# Patient Record
Sex: MALE | Race: WHITE | NOT HISPANIC OR LATINO | URBAN - METROPOLITAN AREA
[De-identification: names, ages, dates, MRNs, and addresses within clinical notes are randomized per-mention and may not be internally consistent; named-entity substitution may affect disease eponyms.]

---

## 2019-11-18 ENCOUNTER — OFFICE VISIT (OUTPATIENT)
Dept: FAMILY MEDICINE CLINIC | Facility: CLINIC | Age: 54
End: 2019-11-18

## 2019-11-18 VITALS
BODY MASS INDEX: 24.59 KG/M2 | SYSTOLIC BLOOD PRESSURE: 122 MMHG | HEIGHT: 74 IN | TEMPERATURE: 97.5 F | HEART RATE: 69 BPM | WEIGHT: 191.6 LBS | OXYGEN SATURATION: 98 % | DIASTOLIC BLOOD PRESSURE: 62 MMHG

## 2019-11-18 DIAGNOSIS — R14.0 ABDOMINAL BLOATING: Primary | ICD-10-CM

## 2019-11-18 DIAGNOSIS — R79.89 LOW VITAMIN D LEVEL: ICD-10-CM

## 2019-11-18 DIAGNOSIS — R10.84 GENERALIZED ABDOMINAL PAIN: ICD-10-CM

## 2019-11-18 LAB
BILIRUB BLD-MCNC: NEGATIVE MG/DL
CLARITY, POC: CLEAR
COLOR UR: YELLOW
GLUCOSE UR STRIP-MCNC: NEGATIVE MG/DL
KETONES UR QL: NEGATIVE
LEUKOCYTE EST, POC: NEGATIVE
NITRITE UR-MCNC: NEGATIVE MG/ML
PH UR: 7.5 [PH] (ref 5–8)
PROT UR STRIP-MCNC: NEGATIVE MG/DL
RBC # UR STRIP: NEGATIVE /UL
SP GR UR: 1.01 (ref 1–1.03)
UROBILINOGEN UR QL: NORMAL

## 2019-11-18 PROCEDURE — 99214 OFFICE O/P EST MOD 30 MIN: CPT | Performed by: FAMILY MEDICINE

## 2019-11-18 PROCEDURE — 81003 URINALYSIS AUTO W/O SCOPE: CPT | Performed by: FAMILY MEDICINE

## 2019-11-18 RX ORDER — FAMOTIDINE 40 MG/1
40 TABLET, FILM COATED ORAL 2 TIMES DAILY
Qty: 60 TABLET | Refills: 0 | Status: SHIPPED | OUTPATIENT
Start: 2019-11-18 | End: 2020-08-19

## 2019-11-18 RX ORDER — AMOXICILLIN 250 MG
1000 CAPSULE ORAL
COMMUNITY

## 2019-11-18 NOTE — PROGRESS NOTES
Subjective   Salinas Pennington is a 54 y.o. male.     Chief Complaint   Patient presents with   • Bloated     achy back, after he has BM still feels like he needs to go        Woke up Friday AM nauseated.  Then moved his bowels but still bloated.  TUMS helps it go away.  Has been achy in his back and feels a little fuzzy in the head.   Feels good to drink cold water or to eat.   Did a cologuard 1-2 years ago.   Denies any changes in his bowels or black or tarry stools.           The following portions of the patient's history were reviewed and updated as appropriate: allergies, current medications, past family history, past medical history, past social history, past surgical history and problem list.    Past Medical History:   Diagnosis Date   • Dietary B12 deficiency    • Flu vaccine need    • Glenohumeral arthritis, right    • Hyperhidrosis, focal, primary     of left thigh, local toa cluster of veins-Abstracted from [facility]   • Need for hepatitis A vaccination    • Prostate cancer screening encounter, options and risks discussed     History of    • Right shoulder pain    • Screening for malignant neoplasm of colon    • Spider veins    • Varicose veins of both lower extremities with complications        History reviewed. No pertinent surgical history.    Family History   Problem Relation Age of Onset   • Other Mother         blood dyscrasia   • Osteoarthritis Mother    • Alzheimer's disease Father    • Pancreatic cancer Father    • No Known Problems Other        Social History     Socioeconomic History   • Marital status:      Spouse name: Not on file   • Number of children: Not on file   • Years of education: Not on file   • Highest education level: Not on file   Tobacco Use   • Smoking status: Never Smoker   Substance and Sexual Activity   • Alcohol use: Yes     Comment: 1 drink per month    • Drug use: No         Current Outpatient Medications:   •  Cholecalciferol (DIALYVITE VITAMIN D 5000 PO), Take  by  "mouth., Disp: , Rfl:   •  Cobalamin Combinations (B-12) 100-5000 MCG sublingual tablet, Place  under the tongue., Disp: , Rfl:   •  famotidine (PEPCID) 40 MG tablet, Take 1 tablet by mouth 2 (Two) Times a Day., Disp: 60 tablet, Rfl: 0    Review of Systems   Constitutional: Negative for chills, fatigue and fever.   HENT: Negative for congestion, rhinorrhea and sore throat.    Respiratory: Negative for cough and shortness of breath.    Cardiovascular: Negative for chest pain and leg swelling.   Gastrointestinal: Positive for abdominal pain and nausea. Negative for constipation, diarrhea and vomiting.   Endocrine: Negative for polydipsia and polyuria.   Genitourinary: Negative for dysuria.   Musculoskeletal: Negative for arthralgias and myalgias.   Skin: Negative for rash.   Neurological: Negative for dizziness.   Hematological: Does not bruise/bleed easily.   Psychiatric/Behavioral: Negative for sleep disturbance.       Objective   Vitals:    11/18/19 0954   BP: 122/62   Pulse: 69   Temp: 97.5 °F (36.4 °C)   SpO2: 98%   Weight: 86.9 kg (191 lb 9.6 oz)   Height: 188 cm (74\")     Body mass index is 24.6 kg/m².  Physical Exam   Constitutional: He is oriented to person, place, and time. He appears well-developed and well-nourished. No distress.   HENT:   Head: Normocephalic and atraumatic.   Mouth/Throat: Oropharynx is clear and moist.   Eyes: Conjunctivae are normal. Pupils are equal, round, and reactive to light.   Neck: Neck supple. No thyromegaly present.   Cardiovascular: Normal rate and regular rhythm.   No murmur heard.  Pulmonary/Chest: Effort normal and breath sounds normal.   Abdominal: Soft. Bowel sounds are normal. There is tenderness in the epigastric area. There is no rebound, no guarding and no CVA tenderness.   Musculoskeletal: He exhibits no edema.   Neurological: He is alert and oriented to person, place, and time.   Skin: Skin is warm and dry.   Psychiatric: He has a normal mood and affect. "         Assessment/Plan   Salinas was seen today for bloated.    Diagnoses and all orders for this visit:    Abdominal bloating  -     POC Urinalysis Dipstick, Automated  -     famotidine (PEPCID) 40 MG tablet; Take 1 tablet by mouth 2 (Two) Times a Day.  -     CBC & Differential  -     Comprehensive Metabolic Panel  -     H. Pylori Breath Test - Breath, Lung  -     Amylase  -     Lipase    Generalized abdominal pain  -     famotidine (PEPCID) 40 MG tablet; Take 1 tablet by mouth 2 (Two) Times a Day.  -     CBC & Differential  -     Comprehensive Metabolic Panel  -     Amylase  -     Lipase    Low vitamin D level  -     Vitamin D 25 hydroxy               Patient Instructions   Call me back with symptom update in a couple weeks.   Follow up pending lab results.

## 2019-11-19 LAB
25(OH)D3+25(OH)D2 SERPL-MCNC: 40.8 NG/ML (ref 30–100)
ALBUMIN SERPL-MCNC: 4.8 G/DL (ref 3.5–5.2)
ALBUMIN/GLOB SERPL: 2.5 G/DL
ALP SERPL-CCNC: 55 U/L (ref 39–117)
ALT SERPL-CCNC: 15 U/L (ref 1–41)
AMYLASE SERPL-CCNC: 45 U/L (ref 28–100)
AST SERPL-CCNC: 16 U/L (ref 1–40)
BASOPHILS # BLD AUTO: 0.03 10*3/MM3 (ref 0–0.2)
BASOPHILS NFR BLD AUTO: 0.8 % (ref 0–1.5)
BILIRUB SERPL-MCNC: 0.5 MG/DL (ref 0.2–1.2)
BUN SERPL-MCNC: 14 MG/DL (ref 6–20)
BUN/CREAT SERPL: 13.5 (ref 7–25)
CALCIUM SERPL-MCNC: 9.6 MG/DL (ref 8.6–10.5)
CHLORIDE SERPL-SCNC: 99 MMOL/L (ref 98–107)
CO2 SERPL-SCNC: 31.7 MMOL/L (ref 22–29)
CREAT SERPL-MCNC: 1.04 MG/DL (ref 0.76–1.27)
EOSINOPHIL # BLD AUTO: 0.08 10*3/MM3 (ref 0–0.4)
EOSINOPHIL NFR BLD AUTO: 2 % (ref 0.3–6.2)
ERYTHROCYTE [DISTWIDTH] IN BLOOD BY AUTOMATED COUNT: 13.6 % (ref 12.3–15.4)
GLOBULIN SER CALC-MCNC: 1.9 GM/DL
GLUCOSE SERPL-MCNC: 84 MG/DL (ref 65–99)
HCT VFR BLD AUTO: 45.2 % (ref 37.5–51)
HGB BLD-MCNC: 15.7 G/DL (ref 13–17.7)
IMM GRANULOCYTES # BLD AUTO: 0.01 10*3/MM3 (ref 0–0.05)
IMM GRANULOCYTES NFR BLD AUTO: 0.3 % (ref 0–0.5)
LIPASE SERPL-CCNC: 30 U/L (ref 13–60)
LYMPHOCYTES # BLD AUTO: 0.79 10*3/MM3 (ref 0.7–3.1)
LYMPHOCYTES NFR BLD AUTO: 19.9 % (ref 19.6–45.3)
MCH RBC QN AUTO: 32.1 PG (ref 26.6–33)
MCHC RBC AUTO-ENTMCNC: 34.7 G/DL (ref 31.5–35.7)
MCV RBC AUTO: 92.4 FL (ref 79–97)
MONOCYTES # BLD AUTO: 0.34 10*3/MM3 (ref 0.1–0.9)
MONOCYTES NFR BLD AUTO: 8.6 % (ref 5–12)
NEUTROPHILS # BLD AUTO: 2.71 10*3/MM3 (ref 1.7–7)
NEUTROPHILS NFR BLD AUTO: 68.4 % (ref 42.7–76)
NRBC BLD AUTO-RTO: 0 /100 WBC (ref 0–0.2)
PLATELET # BLD AUTO: 151 10*3/MM3 (ref 140–450)
POTASSIUM SERPL-SCNC: 4.4 MMOL/L (ref 3.5–5.2)
PROT SERPL-MCNC: 6.7 G/DL (ref 6–8.5)
RBC # BLD AUTO: 4.89 10*6/MM3 (ref 4.14–5.8)
SODIUM SERPL-SCNC: 141 MMOL/L (ref 136–145)
UREA BREATH TEST QL: NEGATIVE
WBC # BLD AUTO: 3.96 10*3/MM3 (ref 3.4–10.8)

## 2019-11-20 ENCOUNTER — TELEPHONE (OUTPATIENT)
Dept: FAMILY MEDICINE CLINIC | Facility: CLINIC | Age: 54
End: 2019-11-20

## 2020-08-19 ENCOUNTER — RESULTS ENCOUNTER (OUTPATIENT)
Dept: FAMILY MEDICINE CLINIC | Facility: CLINIC | Age: 55
End: 2020-08-19

## 2020-08-19 ENCOUNTER — OFFICE VISIT (OUTPATIENT)
Dept: FAMILY MEDICINE CLINIC | Facility: CLINIC | Age: 55
End: 2020-08-19

## 2020-08-19 VITALS
HEIGHT: 74 IN | HEART RATE: 75 BPM | SYSTOLIC BLOOD PRESSURE: 122 MMHG | DIASTOLIC BLOOD PRESSURE: 78 MMHG | BODY MASS INDEX: 22.77 KG/M2 | WEIGHT: 177.4 LBS | TEMPERATURE: 96.9 F | OXYGEN SATURATION: 99 %

## 2020-08-19 DIAGNOSIS — R79.89 LOW SERUM VITAMIN D: ICD-10-CM

## 2020-08-19 DIAGNOSIS — Z12.11 SCREENING FOR COLON CANCER: ICD-10-CM

## 2020-08-19 DIAGNOSIS — E53.8 LOW SERUM VITAMIN B12: ICD-10-CM

## 2020-08-19 DIAGNOSIS — Z11.59 NEED FOR HEPATITIS C SCREENING TEST: ICD-10-CM

## 2020-08-19 DIAGNOSIS — Z00.00 ROUTINE HEALTH MAINTENANCE: Primary | ICD-10-CM

## 2020-08-19 PROCEDURE — 99386 PREV VISIT NEW AGE 40-64: CPT | Performed by: FAMILY MEDICINE

## 2020-08-19 NOTE — PROGRESS NOTES
Subjective   Salinas Pennington is a 55 y.o. male who presents for annual male wellness exam.  Chief Complaint   Patient presents with   • Annual Exam        Sexual History: with wife   Contraception: na  Diet: plant based mostly  Exercise: yes, walking  Do you feel safe? yes  Have you ever been abused? no  Last dental exam: up to date  Eye exam: due    Colon Cancer Screening: due  PSA: defers      Immunization History   Administered Date(s) Administered   • Flu Vaccine Intradermal Quad 18-64YR 11/09/2018   • Hepatitis A 10/01/2018, 07/03/2019   • Influenza, Unspecified 10/01/2019   • Td 05/29/1997   • Tdap 08/03/2017       The following portions of the patient's history were reviewed and updated as appropriate: allergies, current medications, past family history, past medical history, past social history, past surgical history and problem list.    Past Medical History:   Diagnosis Date   • Dietary B12 deficiency    • Flu vaccine need    • Glenohumeral arthritis, right    • Hyperhidrosis, focal, primary     of left thigh, local toa cluster of veins-Abstracted from [facility]   • Need for hepatitis A vaccination    • Prostate cancer screening encounter, options and risks discussed     History of    • Right shoulder pain    • Screening for malignant neoplasm of colon    • Spider veins    • Varicose veins of both lower extremities with complications        History reviewed. No pertinent surgical history.    Family History   Problem Relation Age of Onset   • Other Mother         blood dyscrasia   • Osteoarthritis Mother    • Alzheimer's disease Father    • Pancreatic cancer Father    • No Known Problems Other        Social History     Socioeconomic History   • Marital status:      Spouse name: Not on file   • Number of children: Not on file   • Years of education: Not on file   • Highest education level: Not on file   Tobacco Use   • Smoking status: Never Smoker   • Smokeless tobacco: Never Used   Substance and Sexual  Activity   • Alcohol use: Yes     Comment: 1 drink per month    • Drug use: No         Current Outpatient Medications:   •  Cholecalciferol (DIALYVITE VITAMIN D 5000 PO), Take  by mouth., Disp: , Rfl:   •  Cobalamin Combinations (B-12) 100-5000 MCG sublingual tablet, Place 1,000 mcg under the tongue., Disp: , Rfl:     Review of Systems   Constitutional: Negative for chills, fatigue and fever.   HENT: Negative for congestion, ear pain, rhinorrhea and sore throat.    Eyes: Negative for pain and redness.   Respiratory: Negative for cough, chest tightness and wheezing.    Cardiovascular: Negative for chest pain and leg swelling.   Gastrointestinal: Negative for abdominal pain, constipation, diarrhea, nausea and vomiting.   Endocrine: Negative for polydipsia and polyphagia.   Genitourinary: Negative for dysuria and hematuria.   Musculoskeletal: Negative for arthralgias and myalgias.   Skin: Negative for color change and rash.   Allergic/Immunologic: Negative.    Neurological: Negative for dizziness, weakness, light-headedness and headaches.   Hematological: Negative.    Psychiatric/Behavioral: Negative for confusion, dysphoric mood and sleep disturbance.     Good labs from work     Objective   Vitals:    08/19/20 1036   BP: 122/78   Pulse: 75   Temp: 96.9 °F (36.1 °C)   SpO2: 99%     Body mass index is 22.78 kg/m².  Physical Exam   Constitutional: He is oriented to person, place, and time. He appears well-developed and well-nourished. No distress.   HENT:   Head: Normocephalic and atraumatic.   Mouth/Throat: Oropharynx is clear and moist.   Eyes: Pupils are equal, round, and reactive to light. Conjunctivae are normal.   Neck: Neck supple. No thyromegaly present.   Cardiovascular: Normal rate and regular rhythm.   No murmur heard.  Pulmonary/Chest: Effort normal and breath sounds normal.   Abdominal: Soft. Bowel sounds are normal. There is no tenderness. There is no rebound.   Musculoskeletal: He exhibits no edema.    Neurological: He is alert and oriented to person, place, and time.   Skin: Skin is warm and dry.   Psychiatric: He has a normal mood and affect.         Assessment/Plan   Salinas was seen today for annual exam.    Diagnoses and all orders for this visit:    Routine health maintenance    Low serum vitamin D  -     Vitamin D 25 Hydroxy    Low serum vitamin B12  -     Vitamin B12    Screening for colon cancer  -     Cologuard - Stool, Per Rectum; Future    Need for hepatitis C screening test  -     Hepatitis C Antibody               Discussed the importance of maintaining a healthy weight and getting regular exercise.  Educated patient on the benefits of healthy diet.  Advise follow-up annually for wellness exams.    There are no Patient Instructions on file for this visit.

## 2020-08-20 LAB
25(OH)D3+25(OH)D2 SERPL-MCNC: 53.5 NG/ML (ref 30–100)
HCV AB S/CO SERPL IA: <0.1 S/CO RATIO (ref 0–0.9)
VIT B12 SERPL-MCNC: 1033 PG/ML (ref 211–946)

## 2020-08-25 ENCOUNTER — RESULTS ENCOUNTER (OUTPATIENT)
Dept: FAMILY MEDICINE CLINIC | Facility: CLINIC | Age: 55
End: 2020-08-25

## 2020-08-25 ENCOUNTER — TELEPHONE (OUTPATIENT)
Dept: FAMILY MEDICINE CLINIC | Facility: CLINIC | Age: 55
End: 2020-08-25

## 2020-08-25 DIAGNOSIS — Z12.11 SCREENING FOR COLON CANCER: ICD-10-CM

## 2020-08-25 DIAGNOSIS — Z12.11 SCREENING FOR COLON CANCER: Primary | ICD-10-CM

## 2020-08-25 NOTE — TELEPHONE ENCOUNTER
Somebody screwed up and scanned a paper in the order but the paper was stating that the order was not received. I will have to reorder it.

## 2020-08-25 NOTE — TELEPHONE ENCOUNTER
SO APPARENTLY [facility] RECEIVED AN ORDER FOR THE PATIENT, BUT I DO NOT SEE A ORDER FROM US. DO YOU RECALL HAVING ORDERED ONE? I CAN RE-ENTER THE ORDER AND FAX IT FOR THE PATIENT TO COMPLETE.

## 2021-03-30 ENCOUNTER — BULK ORDERING (OUTPATIENT)
Dept: CASE MANAGEMENT | Facility: OTHER | Age: 56
End: 2021-03-30

## 2021-03-30 DIAGNOSIS — Z23 IMMUNIZATION DUE: ICD-10-CM

## 2021-04-08 ENCOUNTER — OFFICE VISIT (OUTPATIENT)
Dept: FAMILY MEDICINE CLINIC | Facility: CLINIC | Age: 56
End: 2021-04-08

## 2021-04-08 VITALS
TEMPERATURE: 97.7 F | BODY MASS INDEX: 24.77 KG/M2 | HEIGHT: 74 IN | OXYGEN SATURATION: 99 % | HEART RATE: 61 BPM | WEIGHT: 193 LBS | SYSTOLIC BLOOD PRESSURE: 122 MMHG | DIASTOLIC BLOOD PRESSURE: 84 MMHG

## 2021-04-08 DIAGNOSIS — B35.6 TINEA CRURIS: Primary | ICD-10-CM

## 2021-04-08 PROCEDURE — 99213 OFFICE O/P EST LOW 20 MIN: CPT | Performed by: FAMILY MEDICINE

## 2021-04-08 NOTE — PROGRESS NOTES
"Chief Complaint  Rash (Groin Area and buttocks, had for a few months)    Subjective          Salinas Pennington presents to [facility]  Started with bumps in gluteal cleft about 6 months ago.  Itch would come and go.   Has been using some triamcinolone cream and it is spreading.  Out onto buttocks.  Now spread down into his groin.  Not onto scrotum or penis.         Objective   Vital Signs:   /84   Pulse 61   Temp 97.7 °F (36.5 °C)   Ht 188 cm (74\")   Wt 87.5 kg (193 lb)   SpO2 99%   BMI 24.78 kg/m²     Physical Exam  Constitutional:       General: He is not in acute distress.     Appearance: He is well-developed.   HENT:      Head: Normocephalic and atraumatic.   Eyes:      Conjunctiva/sclera: Conjunctivae normal.      Pupils: Pupils are equal, round, and reactive to light.   Pulmonary:      Effort: Pulmonary effort is normal. No respiratory distress.   Skin:     Findings: Rash present.      Comments: Erythematous patches with central clearing and satellite lesions with scale gluteal fold to the right buttock and bilateral groin, some inflammation erythema, no skin breakdown or drainage   Neurological:      Mental Status: He is alert and oriented to person, place, and time.   Psychiatric:         Mood and Affect: Mood normal.         Behavior: Behavior normal.        Result Review :                 Assessment and Plan    Diagnoses and all orders for this visit:    1. Tinea cruris (Primary)  -     econazole nitrate (SPECTAZOLE) 1 % cream; Apply  topically to the appropriate area as directed Daily.  Dispense: 85 g; Refill: 0        Follow Up   Return in about 1 month (around 5/8/2021) for Recheck rash.  Patient was given instructions and counseling regarding his condition or for health maintenance advice. Please see specific information pulled into the AVS if appropriate.       "

## 2021-05-02 ENCOUNTER — PATIENT MESSAGE (OUTPATIENT)
Dept: FAMILY MEDICINE CLINIC | Facility: CLINIC | Age: 56
End: 2021-05-02

## 2021-05-02 DIAGNOSIS — B35.6 TINEA CRURIS: ICD-10-CM

## 2021-05-03 NOTE — TELEPHONE ENCOUNTER
From: Salinas Pennington  To: Meredith Lea Kehrer, MD  Sent: 5/2/2021 3:32 PM EDT  Subject: Prescription Question    I am finished with tube of econazole nitrate. Overall it has reduced the outbreak, but it is not all dead yet. Is it safe for you to order another tube for me or should I get something different? Thank you again for everything.

## 2021-05-12 ENCOUNTER — OFFICE VISIT (OUTPATIENT)
Dept: FAMILY MEDICINE CLINIC | Facility: CLINIC | Age: 56
End: 2021-05-12

## 2021-05-12 VITALS
SYSTOLIC BLOOD PRESSURE: 128 MMHG | DIASTOLIC BLOOD PRESSURE: 68 MMHG | BODY MASS INDEX: 24.97 KG/M2 | WEIGHT: 194.6 LBS | OXYGEN SATURATION: 99 % | HEIGHT: 74 IN | HEART RATE: 83 BPM | TEMPERATURE: 96.9 F

## 2021-05-12 DIAGNOSIS — B35.3 TINEA PEDIS OF RIGHT FOOT: ICD-10-CM

## 2021-05-12 DIAGNOSIS — B35.1 ONYCHOMYCOSIS: ICD-10-CM

## 2021-05-12 DIAGNOSIS — B35.6 TINEA CRURIS: Primary | ICD-10-CM

## 2021-05-12 PROCEDURE — 99213 OFFICE O/P EST LOW 20 MIN: CPT | Performed by: FAMILY MEDICINE

## 2021-05-12 NOTE — PROGRESS NOTES
"Chief Complaint  Rash (groin area, had for a few months)    Subjective          Salinas Pennington presents to [facility]  Patient here for follow-up of his jock itch.  He is doing much better with the econazole.  The itch really could not help him decide when it is inflamed or not.    Some was completely gone.  He does have rash on his right foot from athlete's foot and had a longstanding history of yellowing of to the toenails and now it hasspread to the others.  No pain.  He is trying conservative treatment      Objective   Vital Signs:   /68   Pulse 83   Temp 96.9 °F (36.1 °C)   Ht 188 cm (74\")   Wt 88.3 kg (194 lb 9.6 oz)   SpO2 99%   BMI 24.99 kg/m²     Physical Exam  Constitutional:       General: He is not in acute distress.     Appearance: He is well-developed.   HENT:      Head: Normocephalic and atraumatic.   Eyes:      Conjunctiva/sclera: Conjunctivae normal.      Pupils: Pupils are equal, round, and reactive to light.   Pulmonary:      Effort: Pulmonary effort is normal. No respiratory distress.   Skin:     Findings: Rash present.      Comments: Significant improvement in rash in groin and gluteal fold.  There is mild erythema and mostly hyperpigmentation left.  His right foot has thick yellow nails on every single toenail with some athlete's foot in between.     Neurological:      Mental Status: He is alert and oriented to person, place, and time.   Psychiatric:         Mood and Affect: Mood normal.         Behavior: Behavior normal.        Result Review :                 Assessment and Plan    Diagnoses and all orders for this visit:    1. Tinea cruris (Primary)  -     econazole nitrate (SPECTAZOLE) 1 % cream; Apply  topically to the appropriate area as directed Daily.  Dispense: 85 g; Refill: 2    2. Tinea pedis of right foot    3. Onychomycosis    Tinea cruris-continue econazole as needed  Onychomycosis and tinea pedis-let me know when you are ready to try " Lamisil    Follow Up   No follow-ups on file.  Patient was given instructions and counseling regarding his condition or for health maintenance advice. Please see specific information pulled into the AVS if appropriate.

## 2021-05-12 NOTE — PATIENT INSTRUCTIONS
Make sure to use the cream for 2 weeks after any active rash resolves.  After that use as needed.  Let me know if you decide on systemic treatment for your toenail fungus.      Athlete's Foot    Athlete's foot (tinea pedis) is a fungal infection of the skin on your feet. It often occurs on the skin that is between or underneath the toes. It can also occur on the soles of your feet. The infection can spread from person to person (is contagious). It can also spread when a person's bare feet come in contact with the fungus on shower floors or on items such as shoes.  What are the causes?  This condition is caused by a fungus that grows in warm, moist places. You can get athlete's foot by sharing shoes, shower stalls, towels, and wet floors with someone who is infected. Not washing your feet or changing your socks often enough can also lead to athlete's foot.  What increases the risk?  This condition is more likely to develop in:  · Men.  · People who have a weak body defense system (immune system).  · People who have diabetes.  · People who use public showers, such as at a gym.  · People who wear heavy-duty shoes, such as industrial or  shoes.  · Seasons with warm, humid weather.  What are the signs or symptoms?  Symptoms of this condition include:  · Itchy areas between your toes or on the soles of your feet.  · White, flaky, or scaly areas between your toes or on the soles of your feet.  · Very itchy small blisters between your toes or on the soles of your feet.  · Small cuts in your skin. These cuts can become infected.  · Thick or discolored toenails.  How is this diagnosed?  This condition may be diagnosed with a physical exam and a review of your medical history. Your health care provider may also take a skin or toenail sample to examine under a microscope.  How is this treated?  This condition is treated with antifungal medicines. These may be applied as powders, ointments, or creams. In severe cases, an  oral antifungal medicine may be given.  Follow these instructions at home:  Medicines  · Apply or take over-the-counter and prescription medicines only as told by your health care provider.  · Apply your antifungal medicine as told by your health care provider. Do not stop using the antifungal even if your condition improves.  Foot care  · Do not scratch your feet.  · Keep your feet dry:  ? Wear cotton or wool socks. Change your socks every day or if they become wet.  ? Wear shoes that allow air to flow, such as sandals or canvas tennis shoes.  · Wash and dry your feet, including the area between your toes. Also, wash and dry your feet:  ? Every day or as told by your health care provider.  ? After exercising.  General instructions  · Do not let others use towels, shoes, nail clippers, or other personal items that touch your feet.  · Protect your feet by wearing sandals in wet areas, such as locker rooms and shared showers.  · Keep all follow-up visits as told by your health care provider. This is important.  · If you have diabetes, keep your blood sugar under control.  Contact a health care provider if:  · You have a fever.  · You have swelling, soreness, warmth, or redness in your foot.  · Your feet are not getting better with treatment.  · Your symptoms get worse.  · You have new symptoms.  Summary  · Athlete's foot (tinea pedis) is a fungal infection of the skin on your feet. It often occurs on skin that is between or underneath the toes.  · This condition is caused by a fungus that grows in warm, moist places.  · Symptoms include white, flaky, or scaly areas between your toes or on the soles of your feet.  · This condition is treated with antifungal medicines.  · Keep your feet clean. Always dry them thoroughly.  This information is not intended to replace advice given to you by your health care provider. Make sure you discuss any questions you have with your health care provider.  Document Revised: 12/13/2018  Document Reviewed: 10/08/2018  [email or web address] Patient Education © 2021 [email or web address]    Jock Itch  Jock itch (tinea cruris) is an infection of the skin in the groin area. It is caused by a fungus, which is a type of germ that lives in dark, damp places. Jock itch causes an itchy rash in the groin and upper thigh area. It usually goes away in 2-3 weeks with treatment.  What are the causes?  The fungus that causes jock itch may be spread by:  · Touching a fungus infection elsewhere on your body, such as athlete's foot, and then touching your groin area.  · Sharing towels or clothing, such as socks or shoes, with someone who has a fungal infection.  What increases the risk?  Jock itch is most common in men and adolescent boys. You are also more likely to develop the condition if you:  · Are in a hot, humid climate.  · Wear tight-fitting clothing or wet bathing suits for long periods of time.  · Play sports.  · Are overweight.  · Have diabetes.  · Have a weakened immune system.  · Sweat a lot.  What are the signs or symptoms?  Symptoms of jock itch may include:  · A red, pink, or brown rash in the groin area. Blisters may be present. The rash may spread to the thighs, anus, and buttocks.  · Dry and scaly skin on or around the rash.  · Itchiness.  How is this diagnosed?  In most cases, your health care provider can make the diagnosis by looking at your rash. In some cases, a sample of infected skin may be scraped off. This sample may be examined under a microscope (biopsy) or by trying to grow the fungus from the sample (culture).  How is this treated?  Treatment for this condition may include:  · Antifungal medicine to kill the fungus. This may be a skin cream, ointment, or powder, or it may be a medicine that you take by mouth.  · Skin cream or ointment to reduce itching.  · Lifestyle changes, such as wearing looser clothing and caring for your skin.  Follow these instructions at home:  Skin care  · Apply skin creams,  ointments, or powders exactly as told by your health care provider.  · Wear loose-fitting clothing that does not rub against your groin area. Men should wear boxer shorts or loose-fitting underwear.  · Keep your groin area clean and dry.  ? Change your underwear every day.  ? Change out of wet bathing suits as soon as possible.  ? After bathing, use a separate towel to dry your groin area thoroughly and gently. Using a separate towel will help prevent spreading the infection to other areas of your body.  · Avoid hot baths and showers. Hot water can make itching worse.  · Do not scratch the affected area.  General instructions  · Take and apply over-the-counter and prescription medicines only as told by your health care provider.  · Do not share towels, clothing, or personal items with other people.  · Wash your hands often with soap and water, especially after touching your groin area. If soap and water are not available, use alcohol-based hand .  Contact a health care provider if:  · Your rash:  ? Gets worse or does not get better after 2 weeks of treatment.  ? Spreads.  ? Returns after treatment is finished.  · You have any of the following:  ? A fever.  ? New or worsening redness, swelling, or pain around your rash.  ? Fluid, blood, or pus coming from your rash.  Summary  · Jock itch (tinea cruris) is a fungal infection of the skin in the groin area.  · The fungus can be spread by sharing clothing or by touching a fungus infection elsewhere on your body and then touching your groin area.  · Treatment may include antifungal medicine and lifestyle changes, such as keeping the area clean and dry.  This information is not intended to replace advice given to you by your health care provider. Make sure you discuss any questions you have with your health care provider.  Document Revised: 11/30/2018 Document Reviewed: 11/28/2018  [employer] Patient Education © 2021 [employer]

## 2021-08-25 ENCOUNTER — OFFICE VISIT (OUTPATIENT)
Dept: FAMILY MEDICINE CLINIC | Facility: CLINIC | Age: 56
End: 2021-08-25

## 2021-08-25 VITALS
DIASTOLIC BLOOD PRESSURE: 72 MMHG | SYSTOLIC BLOOD PRESSURE: 128 MMHG | OXYGEN SATURATION: 99 % | TEMPERATURE: 98 F | HEIGHT: 74 IN | BODY MASS INDEX: 24.62 KG/M2 | HEART RATE: 70 BPM | WEIGHT: 191.8 LBS

## 2021-08-25 DIAGNOSIS — Z12.5 SCREENING FOR PROSTATE CANCER: ICD-10-CM

## 2021-08-25 DIAGNOSIS — Z00.00 ROUTINE HEALTH MAINTENANCE: Primary | ICD-10-CM

## 2021-08-25 DIAGNOSIS — E53.8 LOW SERUM VITAMIN B12: ICD-10-CM

## 2021-08-25 DIAGNOSIS — R79.89 LOW SERUM VITAMIN D: ICD-10-CM

## 2021-08-25 LAB
25(OH)D3+25(OH)D2 SERPL-MCNC: 50.9 NG/ML (ref 30–100)
ALBUMIN SERPL-MCNC: 4.5 G/DL (ref 3.5–5.2)
ALBUMIN/GLOB SERPL: 2.4 G/DL
ALP SERPL-CCNC: 55 U/L (ref 39–117)
ALT SERPL-CCNC: 15 U/L (ref 1–41)
AST SERPL-CCNC: 17 U/L (ref 1–40)
BASOPHILS # BLD AUTO: 0.03 10*3/MM3 (ref 0–0.2)
BASOPHILS NFR BLD AUTO: 1 % (ref 0–1.5)
BILIRUB SERPL-MCNC: 0.7 MG/DL (ref 0–1.2)
BUN SERPL-MCNC: 16 MG/DL (ref 6–20)
BUN/CREAT SERPL: 16 (ref 7–25)
CALCIUM SERPL-MCNC: 9.3 MG/DL (ref 8.6–10.5)
CHLORIDE SERPL-SCNC: 103 MMOL/L (ref 98–107)
CHOLEST SERPL-MCNC: 179 MG/DL (ref 0–200)
CO2 SERPL-SCNC: 28 MMOL/L (ref 22–29)
CREAT SERPL-MCNC: 1 MG/DL (ref 0.76–1.27)
EOSINOPHIL # BLD AUTO: 0.06 10*3/MM3 (ref 0–0.4)
EOSINOPHIL NFR BLD AUTO: 1.9 % (ref 0.3–6.2)
ERYTHROCYTE [DISTWIDTH] IN BLOOD BY AUTOMATED COUNT: 12.4 % (ref 12.3–15.4)
GLOBULIN SER CALC-MCNC: 1.9 GM/DL
GLUCOSE SERPL-MCNC: 83 MG/DL (ref 65–99)
HCT VFR BLD AUTO: 46.2 % (ref 37.5–51)
HDLC SERPL-MCNC: 55 MG/DL (ref 40–60)
HGB BLD-MCNC: 16.1 G/DL (ref 13–17.7)
IMM GRANULOCYTES # BLD AUTO: 0 10*3/MM3 (ref 0–0.05)
IMM GRANULOCYTES NFR BLD AUTO: 0 % (ref 0–0.5)
LDLC SERPL CALC-MCNC: 105 MG/DL (ref 0–100)
LYMPHOCYTES # BLD AUTO: 0.83 10*3/MM3 (ref 0.7–3.1)
LYMPHOCYTES NFR BLD AUTO: 26.4 % (ref 19.6–45.3)
MCH RBC QN AUTO: 33.1 PG (ref 26.6–33)
MCHC RBC AUTO-ENTMCNC: 34.8 G/DL (ref 31.5–35.7)
MCV RBC AUTO: 94.9 FL (ref 79–97)
MONOCYTES # BLD AUTO: 0.28 10*3/MM3 (ref 0.1–0.9)
MONOCYTES NFR BLD AUTO: 8.9 % (ref 5–12)
NEUTROPHILS # BLD AUTO: 1.94 10*3/MM3 (ref 1.7–7)
NEUTROPHILS NFR BLD AUTO: 61.8 % (ref 42.7–76)
NRBC BLD AUTO-RTO: 0 /100 WBC (ref 0–0.2)
PLATELET # BLD AUTO: 168 10*3/MM3 (ref 140–450)
POTASSIUM SERPL-SCNC: 4.1 MMOL/L (ref 3.5–5.2)
PROT SERPL-MCNC: 6.4 G/DL (ref 6–8.5)
PSA SERPL-MCNC: 1 NG/ML (ref 0–4)
RBC # BLD AUTO: 4.87 10*6/MM3 (ref 4.14–5.8)
SODIUM SERPL-SCNC: 141 MMOL/L (ref 136–145)
TRIGL SERPL-MCNC: 107 MG/DL (ref 0–150)
TSH SERPL DL<=0.005 MIU/L-ACNC: 2.18 UIU/ML (ref 0.27–4.2)
VIT B12 SERPL-MCNC: 1132 PG/ML (ref 211–946)
VLDLC SERPL CALC-MCNC: 19 MG/DL (ref 5–40)
WBC # BLD AUTO: 3.14 10*3/MM3 (ref 3.4–10.8)

## 2021-08-25 PROCEDURE — 99396 PREV VISIT EST AGE 40-64: CPT | Performed by: FAMILY MEDICINE

## 2021-08-25 NOTE — PROGRESS NOTES
Subjective   Salinas Pennington is a 56 y.o. male who presents for annual male wellness exam.  Chief Complaint   Patient presents with   • Annual Exam     Patient presents for annual needs biometrics for work.  He would like to have his vitamin levels checked since he is still vegan.  His tinea cruris finally cleared up after taking care of it for a while.    Sexual History: With wife, no ED  Contraception: N/A  Diet: Vegan  Exercise: Yes  Do you feel safe?  Yes  Have you ever been abused?  No  Last dental exam: Up-to-date  Eye exam: Up-to-date    Colon Cancer Screenin2020  PSA: Due      Immunization History   Administered Date(s) Administered   • COVID-19 (MODERNA) 2020, 2021   • Flu Vaccine Intradermal Quad 18-64YR 2018   • Hepatitis A 10/01/2018, 2019   • Influenza, Unspecified 10/01/2019, 10/01/2020   • Td 1997   • Tdap 2017       The following portions of the patient's history were reviewed and updated as appropriate: allergies, current medications, past family history, past medical history, past social history, past surgical history and problem list.    Past Medical History:   Diagnosis Date   • Dietary B12 deficiency    • Flu vaccine need    • Glenohumeral arthritis, right    • Hyperhidrosis, focal, primary     of left thigh, local toa cluster of veins-Abstracted from [facility]   • Need for hepatitis A vaccination    • Prostate cancer screening encounter, options and risks discussed     History of    • Right shoulder pain    • Screening for malignant neoplasm of colon    • Spider veins    • Varicose veins of both lower extremities with complications        History reviewed. No pertinent surgical history.    Family History   Problem Relation Age of Onset   • Other Mother         blood dyscrasia   • Osteoarthritis Mother    • Alzheimer's disease Father    • Pancreatic cancer Father    • No Known Problems Other        Social History     Socioeconomic History   • Marital  status:      Spouse name: Not on file   • Number of children: Not on file   • Years of education: Not on file   • Highest education level: Not on file   Tobacco Use   • Smoking status: Never Smoker   • Smokeless tobacco: Never Used   Substance and Sexual Activity   • Alcohol use: Yes     Comment: 1 drink per month    • Drug use: No         Current Outpatient Medications:   •  Cholecalciferol (DIALYVITE VITAMIN D 5000 PO), Take  by mouth., Disp: , Rfl:   •  Cobalamin Combinations (B-12) 100-5000 MCG sublingual tablet, Place 1,000 mcg under the tongue., Disp: , Rfl:   •  econazole nitrate (SPECTAZOLE) 1 % cream, Apply  topically to the appropriate area as directed Daily., Disp: 85 g, Rfl: 2    Review of Systems   Constitutional: Negative for chills, fatigue and fever.   HENT: Negative for congestion, ear pain, rhinorrhea and sore throat.    Eyes: Negative for pain and redness.   Respiratory: Negative for cough, chest tightness and wheezing.    Cardiovascular: Negative for chest pain and leg swelling.   Gastrointestinal: Negative for abdominal pain, constipation, diarrhea, nausea and vomiting.   Endocrine: Negative for polydipsia and polyphagia.   Genitourinary: Negative for dysuria and hematuria.   Musculoskeletal: Negative for arthralgias and myalgias.   Skin: Negative for color change and rash.   Allergic/Immunologic: Negative.    Neurological: Negative for dizziness, weakness, light-headedness and headaches.   Hematological: Negative.    Psychiatric/Behavioral: Negative for confusion, dysphoric mood and sleep disturbance.       Objective   Vitals:    08/25/21 0822   BP: 128/72   Pulse: 70   Temp: 98 °F (36.7 °C)   SpO2: 99%     Body mass index is 24.63 kg/m².  Physical Exam  Vitals and nursing note reviewed.   Constitutional:       General: He is not in acute distress.     Appearance: Normal appearance. He is well-developed.   HENT:      Head: Normocephalic and atraumatic.      Right Ear: Tympanic membrane,  ear canal and external ear normal.      Left Ear: Tympanic membrane, ear canal and external ear normal.      Nose: Nose normal.      Mouth/Throat:      Mouth: Mucous membranes are moist.      Pharynx: Oropharynx is clear.   Eyes:      Conjunctiva/sclera: Conjunctivae normal.      Pupils: Pupils are equal, round, and reactive to light.   Neck:      Thyroid: No thyromegaly.   Cardiovascular:      Rate and Rhythm: Normal rate and regular rhythm.      Heart sounds: No murmur heard.     Pulmonary:      Effort: Pulmonary effort is normal.      Breath sounds: Normal breath sounds.   Abdominal:      General: Abdomen is flat. Bowel sounds are normal. There is no distension.      Palpations: Abdomen is soft. There is no mass.   Musculoskeletal:         General: No swelling or tenderness. Normal range of motion.      Cervical back: Neck supple.   Skin:     General: Skin is warm and dry.      Capillary Refill: Capillary refill takes less than 2 seconds.   Neurological:      Mental Status: He is alert and oriented to person, place, and time.   Psychiatric:         Mood and Affect: Mood normal.         Behavior: Behavior normal.           Assessment/Plan   Diagnoses and all orders for this visit:    1. Routine health maintenance (Primary)  -     Lipid Panel  -     CBC & Differential  -     Comprehensive Metabolic Panel  -     TSH    2. Low serum vitamin B12  -     Vitamin B12    3. Low serum vitamin D  -     Vitamin D 25 Hydroxy    4. Screening for prostate cancer  -     PSA SCREENING        We will fill out work biometric form       Discussed the importance of maintaining a healthy weight and getting regular exercise.  Educated patient on the benefits of healthy diet.  Advise follow-up annually for wellness exams.    There are no Patient Instructions on file for this visit.

## 2021-09-29 ENCOUNTER — LAB (OUTPATIENT)
Dept: FAMILY MEDICINE CLINIC | Facility: CLINIC | Age: 56
End: 2021-09-29

## 2021-09-29 VITALS — TEMPERATURE: 98.6 F

## 2021-09-29 DIAGNOSIS — D72.819 LEUKOPENIA, UNSPECIFIED TYPE: Primary | ICD-10-CM

## 2021-09-29 LAB
BASOPHILS # BLD AUTO: 0.03 10*3/MM3 (ref 0–0.2)
BASOPHILS NFR BLD AUTO: 0.9 % (ref 0–1.5)
EOSINOPHIL # BLD AUTO: 0.08 10*3/MM3 (ref 0–0.4)
EOSINOPHIL NFR BLD AUTO: 2.4 % (ref 0.3–6.2)
ERYTHROCYTE [DISTWIDTH] IN BLOOD BY AUTOMATED COUNT: 12.3 % (ref 12.3–15.4)
HCT VFR BLD AUTO: 46.3 % (ref 37.5–51)
HGB BLD-MCNC: 15.7 G/DL (ref 13–17.7)
IMM GRANULOCYTES # BLD AUTO: 0.01 10*3/MM3 (ref 0–0.05)
IMM GRANULOCYTES NFR BLD AUTO: 0.3 % (ref 0–0.5)
LYMPHOCYTES # BLD AUTO: 0.98 10*3/MM3 (ref 0.7–3.1)
LYMPHOCYTES NFR BLD AUTO: 29.3 % (ref 19.6–45.3)
MCH RBC QN AUTO: 32.6 PG (ref 26.6–33)
MCHC RBC AUTO-ENTMCNC: 33.9 G/DL (ref 31.5–35.7)
MCV RBC AUTO: 96.3 FL (ref 79–97)
MONOCYTES # BLD AUTO: 0.32 10*3/MM3 (ref 0.1–0.9)
MONOCYTES NFR BLD AUTO: 9.6 % (ref 5–12)
NEUTROPHILS # BLD AUTO: 1.92 10*3/MM3 (ref 1.7–7)
NEUTROPHILS NFR BLD AUTO: 57.5 % (ref 42.7–76)
NRBC BLD AUTO-RTO: 0 /100 WBC (ref 0–0.2)
PLATELET # BLD AUTO: 167 10*3/MM3 (ref 140–450)
RBC # BLD AUTO: 4.81 10*6/MM3 (ref 4.14–5.8)
WBC # BLD AUTO: 3.34 10*3/MM3 (ref 3.4–10.8)

## 2021-10-01 ENCOUNTER — TELEPHONE (OUTPATIENT)
Dept: FAMILY MEDICINE CLINIC | Facility: CLINIC | Age: 56
End: 2021-10-01

## 2021-10-01 NOTE — TELEPHONE ENCOUNTER
Caller: Salinas Pennington    Relationship: Self    Best call back number [phone]    What form or medical record are you requesting: COPY OF BIOMETRIC SCREENING PAPERWORK FROM AUGUST     How would you like to receive the form or medical records (pick-up, mail, fax): MAIL    [address] [facility] [id number]      Timeframe paperwork needed: ASAP

## 2021-11-02 ENCOUNTER — TELEPHONE (OUTPATIENT)
Dept: FAMILY MEDICINE CLINIC | Facility: CLINIC | Age: 56
End: 2021-11-02

## 2021-11-02 NOTE — TELEPHONE ENCOUNTER
Caller: Salinas Pennington    Relationship: Self    Best call back number: [phone]    What form or medical record are you requesting: MOST RECENT LAB WORK DONE 08/2020    Who is requesting this form or medical record from you: INSURANCE     How would you like to receive the form or medical records (pick-up, mail, fax): FAX   If fax, what is the fax number: [phone]    Timeframe paperwork needed: ASAP

## 2022-03-17 ENCOUNTER — PATIENT MESSAGE (OUTPATIENT)
Dept: FAMILY MEDICINE CLINIC | Facility: CLINIC | Age: 57
End: 2022-03-17

## 2022-03-17 DIAGNOSIS — B35.6 TINEA CRURIS: ICD-10-CM

## 2022-03-21 NOTE — TELEPHONE ENCOUNTER
From: Salinas Pennington  Sent: 3/21/2022 3:32 PM EDT  To: Rossy Evans [facility]  Subject: skin fungus    Can you ask her if she wants me to make an appointment or if she can just write me a prescription.

## 2022-03-23 DIAGNOSIS — B35.6 TINEA CRURIS: ICD-10-CM

## 2022-08-31 ENCOUNTER — OFFICE VISIT (OUTPATIENT)
Dept: FAMILY MEDICINE CLINIC | Facility: CLINIC | Age: 57
End: 2022-08-31

## 2022-08-31 VITALS
HEART RATE: 84 BPM | OXYGEN SATURATION: 100 % | HEIGHT: 74 IN | TEMPERATURE: 96.8 F | SYSTOLIC BLOOD PRESSURE: 114 MMHG | DIASTOLIC BLOOD PRESSURE: 76 MMHG | BODY MASS INDEX: 23.59 KG/M2 | WEIGHT: 183.8 LBS

## 2022-08-31 DIAGNOSIS — Z12.5 SCREENING FOR PROSTATE CANCER: ICD-10-CM

## 2022-08-31 DIAGNOSIS — D72.819 LEUKOPENIA, UNSPECIFIED TYPE: ICD-10-CM

## 2022-08-31 DIAGNOSIS — R79.89 LOW SERUM VITAMIN D: ICD-10-CM

## 2022-08-31 DIAGNOSIS — E53.8 LOW SERUM VITAMIN B12: ICD-10-CM

## 2022-08-31 DIAGNOSIS — Z00.00 ROUTINE HEALTH MAINTENANCE: Primary | ICD-10-CM

## 2022-08-31 LAB
25(OH)D3+25(OH)D2 SERPL-MCNC: 46.9 NG/ML (ref 30–100)
ALBUMIN SERPL-MCNC: 4.4 G/DL (ref 3.5–5.2)
ALBUMIN/GLOB SERPL: 2.6 G/DL
ALP SERPL-CCNC: 51 U/L (ref 39–117)
ALT SERPL-CCNC: 19 U/L (ref 1–41)
AST SERPL-CCNC: 19 U/L (ref 1–40)
BASOPHILS # BLD AUTO: 0.03 10*3/MM3 (ref 0–0.2)
BASOPHILS NFR BLD AUTO: 0.9 % (ref 0–1.5)
BILIRUB SERPL-MCNC: 0.7 MG/DL (ref 0–1.2)
BUN SERPL-MCNC: 9 MG/DL (ref 6–20)
BUN/CREAT SERPL: 8.5 (ref 7–25)
CALCIUM SERPL-MCNC: 9 MG/DL (ref 8.6–10.5)
CHLORIDE SERPL-SCNC: 102 MMOL/L (ref 98–107)
CHOLEST SERPL-MCNC: 169 MG/DL (ref 0–200)
CO2 SERPL-SCNC: 31.4 MMOL/L (ref 22–29)
CREAT SERPL-MCNC: 1.06 MG/DL (ref 0.76–1.27)
EGFRCR-CYS SERPLBLD CKD-EPI 2021: 81.9 ML/MIN/1.73
EOSINOPHIL # BLD AUTO: 0.05 10*3/MM3 (ref 0–0.4)
EOSINOPHIL NFR BLD AUTO: 1.4 % (ref 0.3–6.2)
ERYTHROCYTE [DISTWIDTH] IN BLOOD BY AUTOMATED COUNT: 12 % (ref 12.3–15.4)
GLOBULIN SER CALC-MCNC: 1.7 GM/DL
GLUCOSE SERPL-MCNC: 85 MG/DL (ref 65–99)
HCT VFR BLD AUTO: 41.8 % (ref 37.5–51)
HDLC SERPL-MCNC: 63 MG/DL (ref 40–60)
HGB BLD-MCNC: 14.6 G/DL (ref 13–17.7)
IMM GRANULOCYTES # BLD AUTO: 0.01 10*3/MM3 (ref 0–0.05)
IMM GRANULOCYTES NFR BLD AUTO: 0.3 % (ref 0–0.5)
LDLC SERPL CALC-MCNC: 86 MG/DL (ref 0–100)
LYMPHOCYTES # BLD AUTO: 0.79 10*3/MM3 (ref 0.7–3.1)
LYMPHOCYTES NFR BLD AUTO: 22.4 % (ref 19.6–45.3)
MCH RBC QN AUTO: 31.7 PG (ref 26.6–33)
MCHC RBC AUTO-ENTMCNC: 34.9 G/DL (ref 31.5–35.7)
MCV RBC AUTO: 90.9 FL (ref 79–97)
MONOCYTES # BLD AUTO: 0.21 10*3/MM3 (ref 0.1–0.9)
MONOCYTES NFR BLD AUTO: 6 % (ref 5–12)
NEUTROPHILS # BLD AUTO: 2.43 10*3/MM3 (ref 1.7–7)
NEUTROPHILS NFR BLD AUTO: 69 % (ref 42.7–76)
NRBC BLD AUTO-RTO: 0 /100 WBC (ref 0–0.2)
PLATELET # BLD AUTO: 160 10*3/MM3 (ref 140–450)
POTASSIUM SERPL-SCNC: 4.2 MMOL/L (ref 3.5–5.2)
PROT SERPL-MCNC: 6.1 G/DL (ref 6–8.5)
PSA SERPL-MCNC: 0.76 NG/ML (ref 0–4)
RBC # BLD AUTO: 4.6 10*6/MM3 (ref 4.14–5.8)
SODIUM SERPL-SCNC: 139 MMOL/L (ref 136–145)
TRIGL SERPL-MCNC: 114 MG/DL (ref 0–150)
TSH SERPL DL<=0.005 MIU/L-ACNC: 1.87 UIU/ML (ref 0.27–4.2)
VIT B12 SERPL-MCNC: 965 PG/ML (ref 211–946)
VLDLC SERPL CALC-MCNC: 20 MG/DL (ref 5–40)
WBC # BLD AUTO: 3.52 10*3/MM3 (ref 3.4–10.8)

## 2022-08-31 PROCEDURE — 99396 PREV VISIT EST AGE 40-64: CPT | Performed by: FAMILY MEDICINE

## 2022-08-31 NOTE — PROGRESS NOTES
Subjective   Salinas Pennington is a 57 y.o. male who presents for annual male wellness exam.  Chief Complaint   Patient presents with   • Annual Exam   Patient presents for his annual.  He is doing well in general.  He has no complaints today.  His varicose vein on his left lower extremity is stable.  He still mostly vegan so needs his vitamin levels checked also needs a recheck on his leukopenia and his fasting glucose and lipids for his work insurance.     Sexual History: With wife, no ed  Contraception: NA  Diet: Mostly vegan  Exercise: some  Last dental exam: up to date*  Eye exam: up to date    Colon Cancer Screenin cologuard  PSA: due      Immunization History   Administered Date(s) Administered   • COVID-19 (MODERNA) 1st, 2nd, 3rd Dose Only 2020, 2021   • COVID-19 (MODERNA) BOOSTER 10/25/2021, 2022   • Flu Vaccine Intradermal Quad 18-64YR 2018   • Flu Vaccine Split Quad 10/25/2021   • Hepatitis A 10/01/2018, 2019   • Influenza, Unspecified 10/01/2019, 10/01/2020, 10/25/2021   • Td 1997   • Tdap 2017       The following portions of the patient's history were reviewed and updated as appropriate: allergies, current medications, past family history, past medical history, past social history, past surgical history and problem list.    Past Medical History:   Diagnosis Date   • Dietary B12 deficiency    • Flu vaccine need    • Glenohumeral arthritis, right    • Hyperhidrosis, focal, primary     of left thigh, local toa cluster of veins-Abstracted from [facility]   • Need for hepatitis A vaccination    • Prostate cancer screening encounter, options and risks discussed     History of    • Right shoulder pain    • Screening for malignant neoplasm of colon    • Spider veins    • Varicose veins of both lower extremities with complications        History reviewed. No pertinent surgical history.    Family History   Problem Relation Age of Onset   • Other Mother         blood  dyscrasia   • Osteoarthritis Mother    • Alzheimer's disease Father    • Pancreatic cancer Father    • No Known Problems Other        Social History     Socioeconomic History   • Marital status:    Tobacco Use   • Smoking status: Never Smoker   • Smokeless tobacco: Never Used   Substance and Sexual Activity   • Alcohol use: Yes     Comment: 1 drink per month    • Drug use: No         Current Outpatient Medications:   •  Cholecalciferol (DIALYVITE VITAMIN D 5000 PO), Take  by mouth., Disp: , Rfl:   •  Cobalamin Combinations (B-12) 100-5000 MCG sublingual tablet, Place 1,000 mcg under the tongue., Disp: , Rfl:   •  econazole nitrate (SPECTAZOLE) 1 % cream, Apply  topically to the appropriate area as directed Daily., Disp: 85 g, Rfl: 0  •  Saw Palmetto 160 MG tablet, Take  by mouth., Disp: , Rfl:     Review of Systems   Constitutional: Negative for chills, fatigue and fever.   HENT: Negative for congestion, ear pain, rhinorrhea and sore throat.    Eyes: Negative for pain and redness.   Respiratory: Negative for cough, chest tightness and wheezing.    Cardiovascular: Negative for chest pain and leg swelling.   Gastrointestinal: Negative for abdominal pain, constipation, diarrhea, nausea and vomiting.   Endocrine: Negative for polydipsia and polyphagia.   Genitourinary: Negative for dysuria and hematuria.   Musculoskeletal: Negative for arthralgias and myalgias.   Skin: Negative for color change and rash.   Allergic/Immunologic: Negative.    Neurological: Negative for dizziness, weakness, light-headedness and headaches.   Hematological: Negative.    Psychiatric/Behavioral: Negative for confusion, dysphoric mood and sleep disturbance.       Objective   Vitals:    08/31/22 0821   BP: 114/76   Pulse: 84   Temp: 96.8 °F (36 °C)   SpO2: 100%     Body mass index is 23.6 kg/m².  Physical Exam  Vitals and nursing note reviewed.   Constitutional:       General: He is not in acute distress.     Appearance: Normal  appearance. He is well-developed.   HENT:      Head: Normocephalic and atraumatic.      Right Ear: Tympanic membrane, ear canal and external ear normal.      Left Ear: Tympanic membrane, ear canal and external ear normal.      Nose: Nose normal.      Mouth/Throat:      Mouth: Mucous membranes are moist.      Pharynx: Oropharynx is clear.   Eyes:      Conjunctiva/sclera: Conjunctivae normal.      Pupils: Pupils are equal, round, and reactive to light.   Neck:      Thyroid: No thyromegaly.   Cardiovascular:      Rate and Rhythm: Normal rate and regular rhythm.      Heart sounds: No murmur heard.  Pulmonary:      Effort: Pulmonary effort is normal.      Breath sounds: Normal breath sounds.   Abdominal:      General: Abdomen is flat. Bowel sounds are normal. There is no distension.      Palpations: Abdomen is soft. There is no mass.   Musculoskeletal:         General: No swelling or tenderness. Normal range of motion.      Cervical back: Neck supple.   Skin:     General: Skin is warm and dry.      Capillary Refill: Capillary refill takes less than 2 seconds.      Comments: Large varicose vein left lower extremity in the saphenous distribution with a venous lake, no edema   Neurological:      Mental Status: He is alert and oriented to person, place, and time.   Psychiatric:         Mood and Affect: Mood normal.         Behavior: Behavior normal.           Assessment & Plan   Diagnoses and all orders for this visit:    1. Routine health maintenance (Primary)  -     Comprehensive Metabolic Panel  -     Lipid Panel    2. Screening for prostate cancer  -     PSA Screen    3. Low serum vitamin D  -     Vitamin D 25 Hydroxy    4. Low serum vitamin B12  -     Vitamin B12    5. Leukopenia, unspecified type  -     CBC & Differential  -     Comprehensive Metabolic Panel  -     TSH               Discussed the importance of maintaining a healthy weight and getting regular exercise.  Educated patient on the benefits of healthy  diet.  Advise follow-up annually for wellness exams.    Patient Instructions   Look into [employer] coverage.

## 2023-03-15 DIAGNOSIS — B35.6 TINEA CRURIS: ICD-10-CM

## 2023-07-12 ENCOUNTER — TELEPHONE (OUTPATIENT)
Dept: FAMILY MEDICINE CLINIC | Facility: CLINIC | Age: 58
End: 2023-07-12

## 2023-07-12 NOTE — TELEPHONE ENCOUNTER
Caller: Salinas Pennington    Relationship to patient: Self      Patient is needing: PATIENT STATES HE LEFT THE PHYSICIAN RESULT FORM AT THE OFFICE TODAY TO COMPLETE.   HE STATES THAT THE MEDICAL ASSISTANT DID NOT HAVE A TAPE MEASURE TO MEASURE HIS WAIST, SO HE MEASURED IT WHEN HE GOT HOME.  PATIENT STATES HIS WAIST MEASUREMENT IS 36 INCHES AND HE WOULD LIKE THAT ADDED ONTO THE FORM.

## 2023-08-23 ENCOUNTER — TELEPHONE (OUTPATIENT)
Dept: FAMILY MEDICINE CLINIC | Facility: CLINIC | Age: 58
End: 2023-08-23
Payer: COMMERCIAL

## 2023-08-24 ENCOUNTER — TELEPHONE (OUTPATIENT)
Dept: FAMILY MEDICINE CLINIC | Facility: CLINIC | Age: 58
End: 2023-08-24
Payer: COMMERCIAL

## 2023-09-28 ENCOUNTER — OFFICE VISIT (OUTPATIENT)
Dept: FAMILY MEDICINE CLINIC | Facility: CLINIC | Age: 58
End: 2023-09-28
Payer: COMMERCIAL

## 2023-09-28 VITALS
TEMPERATURE: 98.2 F | BODY MASS INDEX: 24.56 KG/M2 | WEIGHT: 191.4 LBS | DIASTOLIC BLOOD PRESSURE: 64 MMHG | HEART RATE: 65 BPM | SYSTOLIC BLOOD PRESSURE: 108 MMHG | OXYGEN SATURATION: 99 % | HEIGHT: 74 IN

## 2023-09-28 DIAGNOSIS — M25.531 RIGHT WRIST PAIN: ICD-10-CM

## 2023-09-28 DIAGNOSIS — Z00.00 ROUTINE HEALTH MAINTENANCE: Primary | ICD-10-CM

## 2023-09-28 DIAGNOSIS — D72.819 LEUKOPENIA, UNSPECIFIED TYPE: ICD-10-CM

## 2023-09-28 DIAGNOSIS — Z12.5 SCREENING FOR PROSTATE CANCER: ICD-10-CM

## 2023-09-28 DIAGNOSIS — Z12.11 SCREENING FOR COLON CANCER: ICD-10-CM

## 2023-09-28 DIAGNOSIS — M79.644 THUMB PAIN, RIGHT: ICD-10-CM

## 2023-09-28 PROCEDURE — 99396 PREV VISIT EST AGE 40-64: CPT | Performed by: FAMILY MEDICINE

## 2023-09-28 NOTE — PROGRESS NOTES
Subjective   Salinas Pennington is a 58 y.o. male who presents for annual male wellness exam.  Chief Complaint   Patient presents with    Annual Exam     Had labs over the summer.  Needs CBC rechecked due to low WBC.  Was doing handstands and then got pain in right hand.  Happened 2 months ago.  Not as bad.   .   Sexual History: no ED   Contraception: na  Diet: healthy  Exercise: yes  Last dental exam: up to date  Eye exam: up to date    Colon Cancer Screening: cologuard  PSA: 0.761 on 8/31/2022      Immunization History   Administered Date(s) Administered    COVID-19 (MODERNA) 1st,2nd,3rd Dose Monovalent 12/30/2020, 01/27/2021    COVID-19 (MODERNA) Monovalent Original Booster 10/25/2021, 05/17/2022    Flu Vaccine Intradermal Quad 18-64YR 11/09/2018    Flu Vaccine Split Quad 10/25/2021    Fluzone (or Fluarix & Flulaval for VFC) >6mos 10/25/2021    Hepatitis A 10/01/2018, 07/03/2019    Influenza Injectable Mdck Pf Quad 10/26/2022    Influenza Seasonal Injectable 11/09/2018    Influenza, Unspecified 10/01/2019, 10/01/2020, 10/25/2021    Shingrix 10/29/2022, 02/25/2023    Td (TDVAX) 05/29/1997    Tdap 08/03/2017       The following portions of the patient's history were reviewed and updated as appropriate: allergies, current medications, past family history, past medical history, past social history, past surgical history and problem list.    Past Medical History:   Diagnosis Date    Dietary B12 deficiency     Flu vaccine need     Glenohumeral arthritis, right     Hyperhidrosis, focal, primary     of left thigh, local toa cluster of veins-Abstracted from [facility]    Need for hepatitis A vaccination     Prostate cancer screening encounter, options and risks discussed     History of     Right shoulder pain     Screening for malignant neoplasm of colon     Spider veins     Varicose veins of both lower extremities with complications        History reviewed. No pertinent surgical history.    Family History   Problem Relation  Age of Onset    Other Mother         blood dyscrasia    Osteoarthritis Mother     Alzheimer's disease Father     Pancreatic cancer Father     No Known Problems Other        Social History     Socioeconomic History    Marital status:    Tobacco Use    Smoking status: Never    Smokeless tobacco: Never   Substance and Sexual Activity    Alcohol use: Yes     Comment: 1 drink per month     Drug use: No         Current Outpatient Medications:     Cholecalciferol (DIALYVITE VITAMIN D 5000 PO), Take  by mouth., Disp: , Rfl:     Cobalamin Combinations (B-12) 100-5000 MCG sublingual tablet, Place 1,000 mcg under the tongue., Disp: , Rfl:     Probiotic Product (PROBIOTIC PO), Take  by mouth., Disp: , Rfl:     Review of Systems    Objective   Vitals:    09/28/23 1031   BP: 108/64   Pulse: 65   Temp: 98.2 °F (36.8 °C)   SpO2: 99%     Body mass index is 24.57 kg/m².  Physical Exam  Vitals and nursing note reviewed.   Constitutional:       General: He is not in acute distress.     Appearance: Normal appearance. He is well-developed.   HENT:      Head: Normocephalic and atraumatic.      Right Ear: Tympanic membrane, ear canal and external ear normal.      Left Ear: Tympanic membrane, ear canal and external ear normal.      Nose: Nose normal.      Mouth/Throat:      Mouth: Mucous membranes are moist.      Pharynx: Oropharynx is clear.   Eyes:      Conjunctiva/sclera: Conjunctivae normal.      Pupils: Pupils are equal, round, and reactive to light.   Neck:      Thyroid: No thyromegaly.   Cardiovascular:      Rate and Rhythm: Normal rate and regular rhythm.      Heart sounds: No murmur heard.  Pulmonary:      Effort: Pulmonary effort is normal.      Breath sounds: Normal breath sounds.   Abdominal:      General: Abdomen is flat. Bowel sounds are normal. There is no distension.      Palpations: Abdomen is soft. There is no mass.   Musculoskeletal:         General: Tenderness present. No swelling. Normal range of motion.       Cervical back: Neck supple.      Comments: Tender over the proximal right thumb into the wrist (metacarpal head), good strength and full range of motion   Skin:     General: Skin is warm and dry.      Capillary Refill: Capillary refill takes less than 2 seconds.   Neurological:      Mental Status: He is alert and oriented to person, place, and time.   Psychiatric:         Mood and Affect: Mood normal.         Behavior: Behavior normal.       Lipid Panel (07/12/2023 11:17)  CBC & Differential (07/12/2023 11:17)  Comprehensive Metabolic Panel (07/12/2023 11:17)  CBC & Differential (08/10/2023 02:00)  Assessment & Plan   Diagnoses and all orders for this visit:    1. Routine health maintenance (Primary)    2. Leukopenia, unspecified type  -     CBC & Differential    3. Screening for prostate cancer  -     PSA Screen    4. Screening for colon cancer  -     Cologuard - Stool, Per Rectum; Future    5. Thumb pain, right  -     XR Wrist 3+ View Right; Future  -     XR Finger 2+ View Right (In Office); Future    6. Right wrist pain  -     XR Wrist 3+ View Right; Future  -     XR Finger 2+ View Right (In Office); Future      Low lymphocytes-we will recheck a CBC  Health maintenance-due for PSA and Cologuard  Thumb and wrist pain-we will check x-rays and follow pending results         Discussed the importance of maintaining a healthy weight and getting regular exercise.  Educated patient on the benefits of healthy diet.  Advise follow-up annually for wellness exams.    There are no Patient Instructions on file for this visit.

## 2023-09-29 LAB
BASOPHILS # BLD AUTO: 0.04 10*3/MM3 (ref 0–0.2)
BASOPHILS NFR BLD AUTO: 1.2 % (ref 0–1.5)
EOSINOPHIL # BLD AUTO: 0.05 10*3/MM3 (ref 0–0.4)
EOSINOPHIL NFR BLD AUTO: 1.5 % (ref 0.3–6.2)
ERYTHROCYTE [DISTWIDTH] IN BLOOD BY AUTOMATED COUNT: 13.5 % (ref 12.3–15.4)
HCT VFR BLD AUTO: 41.3 % (ref 37.5–51)
HGB BLD-MCNC: 13.3 G/DL (ref 13–17.7)
IMM GRANULOCYTES # BLD AUTO: 0 10*3/MM3 (ref 0–0.05)
IMM GRANULOCYTES NFR BLD AUTO: 0 % (ref 0–0.5)
LYMPHOCYTES # BLD AUTO: 0.69 10*3/MM3 (ref 0.7–3.1)
LYMPHOCYTES NFR BLD AUTO: 21.2 % (ref 19.6–45.3)
MCH RBC QN AUTO: 27.7 PG (ref 26.6–33)
MCHC RBC AUTO-ENTMCNC: 32.2 G/DL (ref 31.5–35.7)
MCV RBC AUTO: 85.9 FL (ref 79–97)
MONOCYTES # BLD AUTO: 0.28 10*3/MM3 (ref 0.1–0.9)
MONOCYTES NFR BLD AUTO: 8.6 % (ref 5–12)
NEUTROPHILS # BLD AUTO: 2.2 10*3/MM3 (ref 1.7–7)
NEUTROPHILS NFR BLD AUTO: 67.5 % (ref 42.7–76)
NRBC BLD AUTO-RTO: 0 /100 WBC (ref 0–0.2)
PLATELET # BLD AUTO: 171 10*3/MM3 (ref 140–450)
PSA SERPL-MCNC: 0.73 NG/ML (ref 0–4)
RBC # BLD AUTO: 4.81 10*6/MM3 (ref 4.14–5.8)
WBC # BLD AUTO: 3.26 10*3/MM3 (ref 3.4–10.8)

## 2023-10-04 DIAGNOSIS — M79.644 THUMB PAIN, RIGHT: ICD-10-CM

## 2023-10-04 DIAGNOSIS — M25.531 RIGHT WRIST PAIN: ICD-10-CM

## 2023-12-20 ENCOUNTER — TELEPHONE (OUTPATIENT)
Dept: FAMILY MEDICINE CLINIC | Facility: CLINIC | Age: 58
End: 2023-12-20
Payer: COMMERCIAL

## 2023-12-20 NOTE — TELEPHONE ENCOUNTER
Spoke with pt about overdue finger xray. Pt stated he couldn't remember what xray was for and I could cancel order

## 2024-02-23 NOTE — TELEPHONE ENCOUNTER
Pt aware   Attending MD John: 54-year-old gentleman with history of CAD brought to emergency department for reportedly abnormal ECG.  Patient was met in the ER by cardiology fellow who request patient be admitted directly for diagnostic left heart cath.  Cardiology fellow specifically did not want any blood work or medications administered to patient and patient wanted to be expedited directly to Cath Lab. Attending MD John: 54-year-old gentleman with history of CAD brought to emergency department for reportedly abnormal ECG.  Patient was met in the ER by cardiology fellow who request patient be admitted directly for diagnostic left heart cath.  Cardiology fellow specifically did not want any blood work or medications administered to patient and patient wanted to be expedited directly to Cath Lab.    Exam: Patient is awake and alert in no apparent distress.  Vital signs reviewed nonactionable.  Clear lungs anteriorly regular heart sounds distal pulses palpable.  Extremities warm and well-perfused.    Cardiology fellow Trevon sheridan showed me ECG from office appointment that revealed concern for ST elevations in the inferior leads, to my review of this ECG it appears as if the TP segment with reference to J-point did not reveal any elevation.  I did not see any reciprocal ST depression in lead aVL.  No EKG was obtained in the emergency department as per cardiology fellow's request because patient was expedited to Cath Lab prior to any ED diagnostics other than brief history and examination by me. Attending MD John: 54-year-old gentleman with history of CAD brought to emergency department for reportedly abnormal ECG.  Patient was met in the ER by cardiology fellow who request patient be admitted directly for diagnostic left heart cath.  Cardiology fellow specifically did not want any blood work or medications administered to patient and patient wanted to be expedited directly to Cath Lab. Patient states that he was at a routine follow-up appointment with his cardiologist and when ECG was obtained this prompted ER referral.  Patient states that he has no new chest pain no new shortness of breath no nausea vomiting or diaphoresis.  When asked if he has pain he states he has pain all over his body and this is chronic.  No new symptoms today and his office appointment was just a routine follow-up appointment.  He denies any active chest pain at the time of my evaluation.    Exam: Patient is awake and alert in no apparent distress.  Vital signs reviewed nonactionable.  Clear lungs anteriorly regular heart sounds distal pulses palpable.  Extremities warm and well-perfused.    Cardiology fellow Trevon sheridan showed me ECG from office appointment that revealed concern for ST elevations in the inferior leads, to my review of this ECG it appears as if the TP segment with reference to J-point did not reveal any elevation.  I did not see any reciprocal ST depression in lead aVL.  No EKG was obtained in the emergency department as per cardiology fellow's request because patient was expedited to Cath Lab prior to any ED diagnostics other than brief history and examination by me.

## 2024-10-02 ENCOUNTER — OFFICE VISIT (OUTPATIENT)
Dept: FAMILY MEDICINE CLINIC | Facility: CLINIC | Age: 59
End: 2024-10-02
Payer: COMMERCIAL

## 2024-10-02 VITALS
HEIGHT: 74 IN | BODY MASS INDEX: 24 KG/M2 | SYSTOLIC BLOOD PRESSURE: 114 MMHG | DIASTOLIC BLOOD PRESSURE: 78 MMHG | WEIGHT: 187 LBS | HEART RATE: 75 BPM | OXYGEN SATURATION: 98 % | TEMPERATURE: 98.5 F

## 2024-10-02 DIAGNOSIS — Z00.00 ADULT GENERAL MEDICAL EXAMINATION: Primary | ICD-10-CM

## 2024-10-02 DIAGNOSIS — Z12.5 SCREENING FOR PROSTATE CANCER: ICD-10-CM

## 2024-10-02 DIAGNOSIS — D72.818 OTHER DECREASED WHITE BLOOD CELL (WBC) COUNT: ICD-10-CM

## 2024-10-02 LAB
ALBUMIN SERPL-MCNC: 4.4 G/DL (ref 3.5–5.2)
ALBUMIN/GLOB SERPL: 2.4 G/DL
ALP SERPL-CCNC: 51 U/L (ref 39–117)
ALT SERPL-CCNC: 20 U/L (ref 1–41)
AST SERPL-CCNC: 21 U/L (ref 1–40)
BASOPHILS # BLD AUTO: 0.02 10*3/MM3 (ref 0–0.2)
BASOPHILS NFR BLD AUTO: 0.6 % (ref 0–1.5)
BILIRUB SERPL-MCNC: 0.6 MG/DL (ref 0–1.2)
BUN SERPL-MCNC: 14 MG/DL (ref 6–20)
BUN/CREAT SERPL: 13.1 (ref 7–25)
CALCIUM SERPL-MCNC: 9 MG/DL (ref 8.6–10.5)
CHLORIDE SERPL-SCNC: 105 MMOL/L (ref 98–107)
CHOLEST SERPL-MCNC: 191 MG/DL (ref 0–200)
CO2 SERPL-SCNC: 29.3 MMOL/L (ref 22–29)
CREAT SERPL-MCNC: 1.07 MG/DL (ref 0.76–1.27)
EGFRCR SERPLBLD CKD-EPI 2021: 79.9 ML/MIN/1.73
EOSINOPHIL # BLD AUTO: 0.06 10*3/MM3 (ref 0–0.4)
EOSINOPHIL NFR BLD AUTO: 1.7 % (ref 0.3–6.2)
ERYTHROCYTE [DISTWIDTH] IN BLOOD BY AUTOMATED COUNT: 12.9 % (ref 12.3–15.4)
GLOBULIN SER CALC-MCNC: 1.8 GM/DL
GLUCOSE SERPL-MCNC: 85 MG/DL (ref 65–99)
HCT VFR BLD AUTO: 44.7 % (ref 37.5–51)
HDLC SERPL-MCNC: 68 MG/DL (ref 40–60)
HGB BLD-MCNC: 14.8 G/DL (ref 13–17.7)
IMM GRANULOCYTES # BLD AUTO: 0.01 10*3/MM3 (ref 0–0.05)
IMM GRANULOCYTES NFR BLD AUTO: 0.3 % (ref 0–0.5)
LDLC SERPL CALC-MCNC: 108 MG/DL (ref 0–100)
LYMPHOCYTES # BLD AUTO: 0.85 10*3/MM3 (ref 0.7–3.1)
LYMPHOCYTES NFR BLD AUTO: 24.4 % (ref 19.6–45.3)
MCH RBC QN AUTO: 32.1 PG (ref 26.6–33)
MCHC RBC AUTO-ENTMCNC: 33.1 G/DL (ref 31.5–35.7)
MCV RBC AUTO: 97 FL (ref 79–97)
MONOCYTES # BLD AUTO: 0.28 10*3/MM3 (ref 0.1–0.9)
MONOCYTES NFR BLD AUTO: 8 % (ref 5–12)
NEUTROPHILS # BLD AUTO: 2.26 10*3/MM3 (ref 1.7–7)
NEUTROPHILS NFR BLD AUTO: 65 % (ref 42.7–76)
NRBC BLD AUTO-RTO: 0 /100 WBC (ref 0–0.2)
PLATELET # BLD AUTO: 183 10*3/MM3 (ref 140–450)
POTASSIUM SERPL-SCNC: 4.2 MMOL/L (ref 3.5–5.2)
PROT SERPL-MCNC: 6.2 G/DL (ref 6–8.5)
PSA SERPL-MCNC: 1.82 NG/ML (ref 0–4)
RBC # BLD AUTO: 4.61 10*6/MM3 (ref 4.14–5.8)
SODIUM SERPL-SCNC: 141 MMOL/L (ref 136–145)
TRIGL SERPL-MCNC: 83 MG/DL (ref 0–150)
VLDLC SERPL CALC-MCNC: 15 MG/DL (ref 5–40)
WBC # BLD AUTO: 3.48 10*3/MM3 (ref 3.4–10.8)

## 2024-10-02 PROCEDURE — 99396 PREV VISIT EST AGE 40-64: CPT | Performed by: FAMILY MEDICINE

## 2024-10-02 RX ORDER — UREA 10 %
25 LOTION (ML) TOPICAL EVERY OTHER DAY
COMMUNITY

## 2024-10-02 NOTE — PROGRESS NOTES
Subjective   Salinas Pennington is a 59 y.o. male who presents for annual male wellness exam.  Chief Complaint   Patient presents with    Annual Exam        History of Present Illness  The patient is a 59-year-old male who presents for an annual physical.    He generally feels well and expresses no concerns, with the exception of a persistent low white blood cell count.  Several members of his family have a low white blood count as well.       Sexual History: yes, with wife, no ED   Contraception: na  Diet: healthy, mostly plant based  Exercise: yes  Do you feel safe? yes  Have you ever been abused? no  Last dental exam: up to date  Eye exam: up to date    Colon Cancer Screening: negative cologuard  PSA: due      Immunization History   Administered Date(s) Administered    COVID-19 (MODERNA) 12YRS+ (SPIKEVAX) 09/30/2023    COVID-19 (MODERNA) 1st,2nd,3rd Dose Monovalent 12/30/2020, 01/27/2021    COVID-19 (MODERNA) Monovalent Original Booster 10/25/2021, 05/17/2022    Flu Vaccine Intradermal Quad 18-64YR 11/09/2018    Flu Vaccine Split Quad 10/25/2021    Fluzone (or Fluarix & Flulaval for VFC) >6mos 10/25/2021    Hepatitis A 10/01/2018, 07/03/2019    Influenza Injectable Mdck Pf Quad 10/26/2022, 09/30/2023    Influenza Seasonal Injectable 11/09/2018    Influenza, Unspecified 10/01/2019, 10/01/2020, 10/25/2021, 09/02/2023    Shingrix 10/29/2022, 02/25/2023    Td (TDVAX) 05/29/1997    Tdap 08/03/2017       The following portions of the patient's history were reviewed and updated as appropriate: allergies, current medications, past family history, past medical history, past social history, past surgical history and problem list.    Past Medical History:   Diagnosis Date    Dietary B12 deficiency     Flu vaccine need     Glenohumeral arthritis, right     Hyperhidrosis, focal, primary     of left thigh, local toa cluster of veins-Abstracted from [facility]    Need for hepatitis A vaccination     Prostate cancer screening  encounter, options and risks discussed     History of     Right shoulder pain     Screening for malignant neoplasm of colon     Spider veins     Varicose veins of both lower extremities with complications        History reviewed. No pertinent surgical history.    Family History   Problem Relation Age of Onset    Osteoarthritis Mother     Alzheimer's disease Father     Pancreatic cancer Father     No Known Problems Other        Social History     Socioeconomic History    Marital status:    Tobacco Use    Smoking status: Never    Smokeless tobacco: Never   Vaping Use    Vaping status: Never Used   Substance and Sexual Activity    Alcohol use: Yes     Comment: 6 a year    Drug use: Never    Sexual activity: Yes     Partners: Female         Current Outpatient Medications:     Cholecalciferol (DIALYVITE VITAMIN D 5000 PO), Take  by mouth., Disp: , Rfl:     Cobalamin Combinations (B-12) 100-5000 MCG sublingual tablet, Place 1,000 mcg under the tongue., Disp: , Rfl:     Ferrous Sulfate ER 45 MG tablet controlled-release, Take 25 mg by mouth Every Other Day., Disp: , Rfl:     Probiotic Product (PROBIOTIC PO), Take  by mouth., Disp: , Rfl:     Review of Systems    Objective   Vitals:    10/02/24 0831   BP: 114/78   Pulse: 75   Temp: 98.5 °F (36.9 °C)   SpO2: 98%     Body mass index is 24.01 kg/m².  Physical Exam  Vitals and nursing note reviewed.   Constitutional:       General: He is not in acute distress.     Appearance: Normal appearance. He is well-developed.   HENT:      Head: Normocephalic and atraumatic.      Right Ear: Tympanic membrane, ear canal and external ear normal.      Left Ear: Tympanic membrane, ear canal and external ear normal.      Nose: Nose normal.      Mouth/Throat:      Mouth: Mucous membranes are moist.      Pharynx: Oropharynx is clear.   Eyes:      Conjunctiva/sclera: Conjunctivae normal.      Pupils: Pupils are equal, round, and reactive to light.   Neck:      Thyroid: No thyromegaly.    Cardiovascular:      Rate and Rhythm: Normal rate and regular rhythm.      Heart sounds: No murmur heard.  Pulmonary:      Effort: Pulmonary effort is normal.      Breath sounds: Normal breath sounds.   Abdominal:      General: Abdomen is flat. Bowel sounds are normal. There is no distension.      Palpations: Abdomen is soft. There is no mass.   Musculoskeletal:         General: No swelling or tenderness. Normal range of motion.      Cervical back: Neck supple.   Skin:     General: Skin is warm and dry.      Capillary Refill: Capillary refill takes less than 2 seconds.   Neurological:      Mental Status: He is alert and oriented to person, place, and time.   Psychiatric:         Mood and Affect: Mood normal.         Behavior: Behavior normal.       Physical Exam         Results  Laboratory Studies  Persistent low white blood cell count.       Assessment & Plan   Diagnoses and all orders for this visit:    1. Adult general medical examination (Primary)  -     Lipid Panel  -     CBC & Differential  -     Comprehensive Metabolic Panel    2. Other decreased white blood cell (WBC) count  -     CBC & Differential    3. Screening for prostate cancer  -     PSA Screen      Assessment & Plan  The patient is a 59-year-old male here for annual physical.    1. Annual physical.  He generally feels well and has no concerns except persistent low white blood cell count, which we will recheck today.  Teen health maintenance will be updated with a PSA.                Discussed the importance of maintaining a healthy weight and getting regular exercise.  Educated patient on the benefits of healthy diet.  Advise follow-up annually for wellness exams.    There are no Patient Instructions on file for this visit.    Patient or patient representative verbalized consent for the use of Ambient Listening during the visit with  Meredith Lea Kehrer, MD for chart documentation. 10/2/2024  08:47 EDT